# Patient Record
(demographics unavailable — no encounter records)

---

## 2025-01-10 NOTE — CARDIOLOGY SUMMARY
[de-identified] : 12/29/2024, NSR, normal ECG [de-identified] : 12/29/2024, LV EF >65%, normal LV diastolic function, no significant valvular disease

## 2025-01-10 NOTE — DISCUSSION/SUMMARY
[FreeTextEntry1] : 1. Dyspnea: likely lung related, however, given mildly elevated troponins during hospitalization, I am recommending pharmacologic nuclear stress testing.  Office will call with results  Follow up in 1 year.

## 2025-01-10 NOTE — PHYSICAL EXAM
[Normal] : moves all extremities, no focal deficits, normal speech [de-identified] : ambulates with rolling walker

## 2025-01-10 NOTE — HISTORY OF PRESENT ILLNESS
[FreeTextEntry1] : 77 year old female with PMHx GERD, COPD, former smoker (25 pack years), RA, severe peripheral neuropathy of the lower extremities (L  > R), presented to [facility] [place] with complaint of acute dyspnea, 12/29/2024. She stated that she had a URI for the past week, endorsing symptoms of runny nose, cough, and sore throat. She was evaluated in  1 week ago and tested negative for strep, covid, and flu. She felt that she was starting to improve over the past few days. However, that morning she woke up feeling fine, made herself breakfast, and then felt suddenly breathless prompting EMS activation. She stated that she suddenly could no move any air. Denies any CP, palpitations, dizziness, lightheadedness, syncope/pre-syncope, orthopnea. Denies fevers/chills. No history of thromboembolism. 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 Patient had PE ruled out. She had normal proBNP. She had mildly elevated troponins that peaked in the high 40s then trended down.   There is no history of MI, CVA, CHF, or previous coronary intervention.